# Patient Record
(demographics unavailable — no encounter records)

---

## 2020-01-15 NOTE — REP
CT ABDOMEN AND PELVIS WITH IV CONTRAST:

 

TECHNIQUE:  Axial contrast enhanced images from the lung bases to the pubic

symphysis using 100 mL Isovue 370 intravenous contrast material with multiplanar

reformations.

 

Visualized lung bases are clear.  The liver, spleen, adrenals, pancreas and

kidneys are unremarkable.  There is no hydronephrosis.  Gallbladder is minimally

distended and grossly unremarkable.  I do not see evidence of biliary dilatation.

Abdominal aorta is normal in caliber with no aneurysm.  I seen no adenopathy or

free air.  No bowel wall thickening is seen.  The appendix is normal.  In the

pelvis, the urinary bladder is mildly distended and grossly unremarkable.  There

appears to be a small cystic structure of the right ovary approximately 1.8 cm in

diameter probably representing a dominant follicle.  There is mild free fluid in

the right pelvis.

 

IMPRESSION:

 

Appendix is normal with no evidence of appendicitis.  No free air or bowel wall

thickening.  No hydronephrosis.  Small cystic structure right ovary 1.8 cm

probably represents a dominant follicle.  Mild free fluid in the right pelvis.

 

 

Electronically Signed by

Fab Zayas MD 01/15/2020 05:38 P